# Patient Record
Sex: MALE | Race: WHITE | Employment: FULL TIME
[De-identification: names, ages, dates, MRNs, and addresses within clinical notes are randomized per-mention and may not be internally consistent; named-entity substitution may affect disease eponyms.]

---

## 2022-08-18 RX ORDER — ERGOCALCIFEROL 1.25 MG/1
CAPSULE ORAL
Qty: 30 CAPSULE | Refills: 0 | OUTPATIENT
Start: 2022-08-18

## 2022-10-11 NOTE — PROGRESS NOTES
Brandon Ackerman (: 1990) presents today for physical and follow up. He has a past medical history of high cholesterol and vitamin D deficiency. Blood work done last (2022) showing all normal. Started taking clomid- to try to get sperm count increased. Doing weight training and walking for exercise. Does not smoke. Drinks alcohol occasionally. Goes to ophthalmologist yearly. Goes to dentist twice a year. Had tdap . Will get flu vaccine soon-wanted to wait since he is playing golf tomorrow. Does not smoke. Drinks alcohol maybe 3 times a week. Had skin exam done and they removed a lesion-has follow up with them soon. Cholesterol Problem   The history is provided by the patient. This is a chronic problem. The problem occurs constantly. The problem has not changed since onset. Pertinent negatives include no chest pain, no abdominal pain, no headaches and no shortness of breath. Nothing aggravates the symptoms. The symptoms are relieved by diet changes. Treatments tried: Lipitor. The treatment provided moderate relief. Vitamin D Deficiency    The history is provided by the patient. This is a chronic problem. The problem has not changed since onset. There are no associated symptoms. Pertinent negatives include no malaise/fatigue. Medications have helped normalize vitamin d level. Risk factors include limited sun exposure.     Chief Complaint   Patient presents with    Annual Exam     Patient Active Problem List   Diagnosis    Episodic tension-type headache, not intractable    Migraine headache with aura    Generalized anxiety disorder     Past Medical History:   Diagnosis Date    Depression     Symptomatic off and on since high school    Dysplastic nevi     Episodic tension-type headache, not intractable 2015    Generalized anxiety disorder     Symptomatic since high school    Hypercholesterolemia Migraine headache with aura 12/6/2015    Seasonal allergic rhinitis     Vitamin D deficiency      Past Surgical History:   Procedure Laterality Date    WISDOM TOOTH EXTRACTION  12/2000     Family History   Problem Relation Age of Onset    Elevated Lipids Father     Cancer Paternal Grandfather         colon, pacreatic,    Heart Attack Paternal Grandfather     Thyroid Disease Paternal Grandfather     Diabetes Paternal Grandfather     Diabetes Paternal Grandmother     Stroke Paternal Grandmother     No Known Problems Sister     Seizures Brother         Also Autistic    No Known Problems Maternal Grandmother     No Known Problems Maternal Grandfather     Breast Cancer Paternal Aunt     Depression Mother     Anxiety Disorder Mother     Bipolar Disorder Mother     Hypertension Father      Social History     Socioeconomic History    Marital status:      Spouse name: None    Number of children: None    Years of education: None    Highest education level: None   Tobacco Use    Smoking status: Never    Smokeless tobacco: Never   Vaping Use    Vaping Use: Never used   Substance and Sexual Activity    Alcohol use: Yes    Drug use: No       Allergies   Allergen Reactions    Clindamycin Hives     Leukopenia and thrombocytopenia    Paroxetine Hcl Other (See Comments)     And very decreased libido on just 10 mh qd     Immunizations:  Immunization status: due today.     Review of Systems - History obtained from the patient  General ROS: negative for - chills, fatigue, or fever  Psychological ROS: negative for - depression  Ophthalmic ROS: negative for - blurry vision or double vision  ENT ROS: negative for - nasal congestion or sore throat  Respiratory ROS: no cough, shortness of breath, or wheezing  Cardiovascular ROS: no chest pain or dyspnea on exertion  Gastrointestinal ROS: no abdominal pain, change in bowel habits, or black or bloody stools  Genito-Urinary ROS: no dysuria, trouble voiding, or hematuria  Musculoskeletal ROS: negative for - joint pain or joint swelling  Neurological ROS: negative for - confusion, dizziness, or numbness/tingling  Dermatological ROS: negative for - pruritus or rash    BP (!) 140/70   Pulse 73   Temp 97.3 °F (36.3 °C) (Temporal)   Resp 16   Ht 6' (1.829 m)   Wt 166 lb (75.3 kg)   SpO2 96%   BMI 22.51 kg/m²     Physical Examination: General appearance - alert, well appearing, and in no distress, oriented to person, place, and time, and normal appearing weight  Mental status - alert, oriented to person, place, and time, normal mood, behavior, speech, dress, motor activity, and thought processes, affect appropriate to mood  Eyes - pupils equal and reactive, extraocular eye movements intact  Ears - bilateral TM's and external ear canals normal  Nose - normal and patent, no erythema, discharge or polyps  Mouth - mucous membranes moist, pharynx normal without lesions  Neck - supple, no significant adenopathy  Lymphatics - no palpable lymphadenopathy, no hepatosplenomegaly  Chest - clear to auscultation, no wheezes, rales or rhonchi, symmetric air entry  Heart - normal rate, regular rhythm, normal S1, S2, no murmurs, rubs, clicks or gallops  Abdomen - soft, nontender, nondistended, no masses or organomegaly  Back exam - full range of motion, no tenderness, palpable spasm or pain on motion  Neurological - alert, oriented, normal speech, no focal findings or movement disorder noted  Musculoskeletal - no joint tenderness, deformity or swelling  Extremities - peripheral pulses normal, no pedal edema, no clubbing or cyanosis  Skin - normal coloration and turgor, no rashes, no suspicious skin lesions noted    Assessment/Plan:   Diagnosis Orders   1. Routine general medical examination at a health care facility  CBC with Auto Differential    Lipid Panel    Comprehensive Metabolic Panel    TSH      2.  Mixed hyperlipidemia  atorvastatin (LIPITOR) 20 MG tablet    CBC with Auto Differential    Lipid Panel Comprehensive Metabolic Panel      3. Vitamin D deficiency, unspecified  Vitamin D 25 Hydroxy        Checking blood work today. Continue lipitor and vitamin d. High cholesterol can significantly increase your risk of developing chest pain, heart attack, and stroke. Cholesterol can be lowered with lifestyle changes and certain medications. Recommend to reduce total and saturated fat in diet, lose weight, participate in aerobic exercise, and eat plenty of fruits and vegetables. Vitamin D plays an important role in many places throughout the body, including the development and calcification of the bones. The main reasons for low levels of vitamin D are: Lack of vitamin D in the diet, often in conjunction with inadequate sun exposure, Inability to absorb vitamin D from the intestines, Inability to process vitamin D due to kidney or liver disease. Encouraged to increase sun light exposure safely and take medication if prescribed. Anticipatory Guidance/Education:  Anticipatory guidance for age-seatbelts, avoid cell phone use in car (may use hands free), no texting while driving, avoid social drugs and tobacco, limit alcohol, fall safety, personal safety. Encourage healthy diet and exercise daily. Follow up 6 months.   Delisa Arteaga, MILLI - CNP

## 2022-10-14 ENCOUNTER — OFFICE VISIT (OUTPATIENT)
Dept: FAMILY MEDICINE CLINIC | Facility: CLINIC | Age: 32
End: 2022-10-14
Payer: COMMERCIAL

## 2022-10-14 VITALS
TEMPERATURE: 97.3 F | RESPIRATION RATE: 16 BRPM | BODY MASS INDEX: 22.48 KG/M2 | HEART RATE: 73 BPM | SYSTOLIC BLOOD PRESSURE: 120 MMHG | OXYGEN SATURATION: 96 % | DIASTOLIC BLOOD PRESSURE: 80 MMHG | HEIGHT: 72 IN | WEIGHT: 166 LBS

## 2022-10-14 DIAGNOSIS — E55.9 VITAMIN D DEFICIENCY, UNSPECIFIED: ICD-10-CM

## 2022-10-14 DIAGNOSIS — Z00.00 ROUTINE GENERAL MEDICAL EXAMINATION AT A HEALTH CARE FACILITY: Primary | ICD-10-CM

## 2022-10-14 DIAGNOSIS — E78.2 MIXED HYPERLIPIDEMIA: ICD-10-CM

## 2022-10-14 LAB
BASOPHILS # BLD: 0.1 K/UL (ref 0–0.2)
BASOPHILS NFR BLD: 1 % (ref 0–2)
DIFFERENTIAL METHOD BLD: NORMAL
EOSINOPHIL # BLD: 0.1 K/UL (ref 0–0.8)
EOSINOPHIL NFR BLD: 2 % (ref 0.5–7.8)
ERYTHROCYTE [DISTWIDTH] IN BLOOD BY AUTOMATED COUNT: 12.7 % (ref 11.9–14.6)
HCT VFR BLD AUTO: 47.8 % (ref 41.1–50.3)
HGB BLD-MCNC: 15.9 G/DL (ref 13.6–17.2)
IMM GRANULOCYTES # BLD AUTO: 0 K/UL (ref 0–0.5)
IMM GRANULOCYTES NFR BLD AUTO: 0 % (ref 0–5)
LYMPHOCYTES # BLD: 1.7 K/UL (ref 0.5–4.6)
LYMPHOCYTES NFR BLD: 27 % (ref 13–44)
MCH RBC QN AUTO: 29.7 PG (ref 26.1–32.9)
MCHC RBC AUTO-ENTMCNC: 33.3 G/DL (ref 31.4–35)
MCV RBC AUTO: 89.3 FL (ref 82–102)
MONOCYTES # BLD: 0.5 K/UL (ref 0.1–1.3)
MONOCYTES NFR BLD: 8 % (ref 4–12)
NEUTS SEG # BLD: 3.7 K/UL (ref 1.7–8.2)
NEUTS SEG NFR BLD: 62 % (ref 43–78)
NRBC # BLD: 0 K/UL (ref 0–0.2)
PLATELET # BLD AUTO: 239 K/UL (ref 150–450)
PMV BLD AUTO: 9.7 FL (ref 9.4–12.3)
RBC # BLD AUTO: 5.35 M/UL (ref 4.23–5.6)
WBC # BLD AUTO: 6.1 K/UL (ref 4.3–11.1)

## 2022-10-14 PROCEDURE — 99395 PREV VISIT EST AGE 18-39: CPT | Performed by: NURSE PRACTITIONER

## 2022-10-14 PROCEDURE — 99214 OFFICE O/P EST MOD 30 MIN: CPT | Performed by: NURSE PRACTITIONER

## 2022-10-14 RX ORDER — ATORVASTATIN CALCIUM 20 MG/1
20 TABLET, FILM COATED ORAL DAILY
Qty: 90 TABLET | Refills: 1 | Status: SHIPPED | OUTPATIENT
Start: 2022-10-14 | End: 2023-04-12

## 2022-10-14 ASSESSMENT — PATIENT HEALTH QUESTIONNAIRE - PHQ9
SUM OF ALL RESPONSES TO PHQ QUESTIONS 1-9: 0
SUM OF ALL RESPONSES TO PHQ QUESTIONS 1-9: 0
1. LITTLE INTEREST OR PLEASURE IN DOING THINGS: 0
SUM OF ALL RESPONSES TO PHQ QUESTIONS 1-9: 0
SUM OF ALL RESPONSES TO PHQ QUESTIONS 1-9: 0
2. FEELING DOWN, DEPRESSED OR HOPELESS: 0
SUM OF ALL RESPONSES TO PHQ9 QUESTIONS 1 & 2: 0

## 2022-10-15 LAB
25(OH)D3 SERPL-MCNC: 47.2 NG/ML (ref 30–100)
ALBUMIN SERPL-MCNC: 4.2 G/DL (ref 3.5–5)
ALBUMIN/GLOB SERPL: 1.6 {RATIO} (ref 0.4–1.6)
ALP SERPL-CCNC: 47 U/L (ref 50–136)
ALT SERPL-CCNC: 35 U/L (ref 12–65)
ANION GAP SERPL CALC-SCNC: 1 MMOL/L (ref 2–11)
AST SERPL-CCNC: 13 U/L (ref 15–37)
BILIRUB SERPL-MCNC: 1.3 MG/DL (ref 0.2–1.1)
BUN SERPL-MCNC: 16 MG/DL (ref 6–23)
CALCIUM SERPL-MCNC: 9 MG/DL (ref 8.3–10.4)
CHLORIDE SERPL-SCNC: 109 MMOL/L (ref 101–110)
CHOLEST SERPL-MCNC: 124 MG/DL
CO2 SERPL-SCNC: 28 MMOL/L (ref 21–32)
CREAT SERPL-MCNC: 1.1 MG/DL (ref 0.8–1.5)
GLOBULIN SER CALC-MCNC: 2.6 G/DL (ref 2.8–4.5)
GLUCOSE SERPL-MCNC: 85 MG/DL (ref 65–100)
HDLC SERPL-MCNC: 43 MG/DL (ref 40–60)
HDLC SERPL: 2.9 {RATIO}
LDLC SERPL CALC-MCNC: 63.4 MG/DL
POTASSIUM SERPL-SCNC: 3.9 MMOL/L (ref 3.5–5.1)
PROT SERPL-MCNC: 6.8 G/DL (ref 6.3–8.2)
SODIUM SERPL-SCNC: 138 MMOL/L (ref 133–143)
TRIGL SERPL-MCNC: 88 MG/DL (ref 35–150)
TSH, 3RD GENERATION: 1.38 UIU/ML (ref 0.36–3.74)
VLDLC SERPL CALC-MCNC: 17.6 MG/DL (ref 6–23)

## 2023-08-31 ENCOUNTER — HOSPITAL ENCOUNTER (EMERGENCY)
Age: 33
Discharge: HOME | End: 2023-08-31
Attending: EMERGENCY MEDICINE
Payer: COMMERCIAL

## 2023-08-31 ENCOUNTER — APPOINTMENT (OUTPATIENT)
Dept: GENERAL RADIOLOGY | Age: 33
End: 2023-08-31
Payer: COMMERCIAL

## 2023-08-31 VITALS
DIASTOLIC BLOOD PRESSURE: 80 MMHG | BODY MASS INDEX: 22.75 KG/M2 | SYSTOLIC BLOOD PRESSURE: 122 MMHG | WEIGHT: 168 LBS | RESPIRATION RATE: 14 BRPM | HEIGHT: 72 IN | TEMPERATURE: 97.6 F | HEART RATE: 75 BPM | OXYGEN SATURATION: 97 %

## 2023-08-31 DIAGNOSIS — R07.9 CHEST PAIN, UNSPECIFIED TYPE: Primary | ICD-10-CM

## 2023-08-31 LAB
ANION GAP SERPL CALC-SCNC: 9 MMOL/L (ref 2–11)
BASOPHILS # BLD: 0.1 K/UL (ref 0–0.2)
BASOPHILS NFR BLD: 1 % (ref 0–2)
BUN SERPL-MCNC: 14 MG/DL (ref 6–23)
CALCIUM SERPL-MCNC: 9.6 MG/DL (ref 8.3–10.4)
CHLORIDE SERPL-SCNC: 104 MMOL/L (ref 98–107)
CO2 SERPL-SCNC: 28 MMOL/L (ref 21–32)
CREAT SERPL-MCNC: 0.98 MG/DL (ref 0.8–1.5)
DIFFERENTIAL METHOD BLD: ABNORMAL
EKG ATRIAL RATE: 78 BPM
EKG DIAGNOSIS: NORMAL
EKG P AXIS: 70 DEGREES
EKG P-R INTERVAL: 137 MS
EKG Q-T INTERVAL: 391 MS
EKG QRS DURATION: 100 MS
EKG QTC CALCULATION (BAZETT): 446 MS
EKG R AXIS: 81 DEGREES
EKG T AXIS: 83 DEGREES
EKG VENTRICULAR RATE: 78 BPM
EOSINOPHIL # BLD: 1.1 K/UL (ref 0–0.8)
EOSINOPHIL NFR BLD: 17 % (ref 0.5–7.8)
ERYTHROCYTE [DISTWIDTH] IN BLOOD BY AUTOMATED COUNT: 11.9 % (ref 11.9–14.6)
GLUCOSE SERPL-MCNC: 99 MG/DL (ref 65–100)
HCT VFR BLD AUTO: 47.2 % (ref 41.1–50.3)
HGB BLD-MCNC: 16.3 G/DL (ref 13.6–17.2)
IMM GRANULOCYTES # BLD AUTO: 0 K/UL (ref 0–0.5)
IMM GRANULOCYTES NFR BLD AUTO: 0 % (ref 0–5)
LYMPHOCYTES # BLD: 2.3 K/UL (ref 0.5–4.6)
LYMPHOCYTES NFR BLD: 35 % (ref 13–44)
MCH RBC QN AUTO: 29.7 PG (ref 26.1–32.9)
MCHC RBC AUTO-ENTMCNC: 34.5 G/DL (ref 31.4–35)
MCV RBC AUTO: 86 FL (ref 82–102)
MONOCYTES # BLD: 0.5 K/UL (ref 0.1–1.3)
MONOCYTES NFR BLD: 8 % (ref 4–12)
NEUTS SEG # BLD: 2.5 K/UL (ref 1.7–8.2)
NEUTS SEG NFR BLD: 39 % (ref 43–78)
NRBC # BLD: 0 K/UL (ref 0–0.2)
PLATELET # BLD AUTO: 239 K/UL (ref 150–450)
PMV BLD AUTO: 8.8 FL (ref 9.4–12.3)
POTASSIUM SERPL-SCNC: 3.9 MMOL/L (ref 3.5–5.1)
RBC # BLD AUTO: 5.49 M/UL (ref 4.23–5.6)
SODIUM SERPL-SCNC: 141 MMOL/L (ref 133–143)
TROPONIN T SERPL HS-MCNC: <6 NG/L (ref 0–22)
TROPONIN T SERPL HS-MCNC: <6 NG/L (ref 0–22)
WBC # BLD AUTO: 6.5 K/UL (ref 4.3–11.1)

## 2023-08-31 PROCEDURE — 80048 BASIC METABOLIC PNL TOTAL CA: CPT

## 2023-08-31 PROCEDURE — 84484 ASSAY OF TROPONIN QUANT: CPT

## 2023-08-31 PROCEDURE — 71046 X-RAY EXAM CHEST 2 VIEWS: CPT

## 2023-08-31 PROCEDURE — 93005 ELECTROCARDIOGRAM TRACING: CPT | Performed by: EMERGENCY MEDICINE

## 2023-08-31 PROCEDURE — 93010 ELECTROCARDIOGRAM REPORT: CPT | Performed by: INTERNAL MEDICINE

## 2023-08-31 PROCEDURE — 99285 EMERGENCY DEPT VISIT HI MDM: CPT

## 2023-08-31 PROCEDURE — 94762 N-INVAS EAR/PLS OXIMTRY CONT: CPT

## 2023-08-31 PROCEDURE — 6370000000 HC RX 637 (ALT 250 FOR IP): Performed by: EMERGENCY MEDICINE

## 2023-08-31 PROCEDURE — 85025 COMPLETE CBC W/AUTO DIFF WBC: CPT

## 2023-08-31 RX ORDER — MAGNESIUM HYDROXIDE/ALUMINUM HYDROXICE/SIMETHICONE 120; 1200; 1200 MG/30ML; MG/30ML; MG/30ML
30 SUSPENSION ORAL
Status: COMPLETED | OUTPATIENT
Start: 2023-08-31 | End: 2023-08-31

## 2023-08-31 RX ORDER — ASPIRIN 325 MG
325 TABLET ORAL
Status: COMPLETED | OUTPATIENT
Start: 2023-08-31 | End: 2023-08-31

## 2023-08-31 RX ADMIN — ASPIRIN 325 MG: 325 TABLET, FILM COATED ORAL at 05:37

## 2023-08-31 RX ADMIN — ALUMINUM HYDROXIDE, MAGNESIUM HYDROXIDE, AND SIMETHICONE 30 ML: 200; 200; 20 SUSPENSION ORAL at 06:26

## 2023-08-31 ASSESSMENT — ENCOUNTER SYMPTOMS
DIARRHEA: 0
VOMITING: 0
SHORTNESS OF BREATH: 0
ABDOMINAL PAIN: 0
FACIAL SWELLING: 0

## 2023-08-31 ASSESSMENT — LIFESTYLE VARIABLES
HOW OFTEN DO YOU HAVE A DRINK CONTAINING ALCOHOL: 2-4 TIMES A MONTH
HOW MANY STANDARD DRINKS CONTAINING ALCOHOL DO YOU HAVE ON A TYPICAL DAY: 1 OR 2

## 2023-08-31 ASSESSMENT — PAIN - FUNCTIONAL ASSESSMENT: PAIN_FUNCTIONAL_ASSESSMENT: 0-10

## 2023-08-31 ASSESSMENT — PAIN SCALES - GENERAL: PAINLEVEL_OUTOF10: 2

## 2023-08-31 NOTE — ED PROVIDER NOTES
Emergency Department Provider Note       PCP: MILLI Castellanos CNP   Age: 35 y.o. Sex: male     DISPOSITION Discharge - Pending Orders Complete 08/31/2023 06:09:46 AM       ICD-10-CM    1. Chest pain, unspecified type  R07.9           Medical Decision Making     Complexity of Problems Addressed:  1 or more acute illnesses that pose a threat to life or bodily function. Data Reviewed and Analyzed:   I independently ordered and reviewed each unique test.         I independently ordered and interpreted the ED EKG in the absence of a Cardiologist.    Rate: 78  EKG Interpretation: EKG Interpretation: sinus rhythm, no evidence of arrhythmia  ST Segments: Normal ST segments - NO STEMI  I interpreted the X-rays no acute cardiopulmonary abnormality. Discussion of management or test interpretation. Work-up so far unremarkable. Will check second troponin. Low suspicion for coronary etiology. PERC negative. Suspect some anxiety and possible GI etiology. Given GI cocktail. Plan to discharge if second troponin normal.      Risk of Complications and/or Morbidity of Patient Management:  Patient was discharged risks and benefits of hospitalization were considered. Shared medical decision making was utilized in creating the patients health plan today. Is this patient to be included in the SEP-1 core measure due to severe sepsis or septic shock? No Exclusion criteria - the patient is NOT to be included for SEP-1 Core Measure due to: Infection is not suspected      History      Heri Nelson is a 35 y.o. male who presents to the Emergency Department with chief complaint of    Chief Complaint   Patient presents with    Chest Pain      77-year-old male with history of high cholesterol and anxiety presents with sudden onset of \"pinching\" right-sided chest pain that woke him up from sleep around 4 AM.  He states pain gradually became more \"pronounced.   \"This then caused some shortness of breath and

## 2023-09-25 ENCOUNTER — INITIAL CONSULT (OUTPATIENT)
Age: 33
End: 2023-09-25
Payer: COMMERCIAL

## 2023-09-25 VITALS
HEIGHT: 72 IN | SYSTOLIC BLOOD PRESSURE: 116 MMHG | HEART RATE: 64 BPM | WEIGHT: 172 LBS | BODY MASS INDEX: 23.3 KG/M2 | DIASTOLIC BLOOD PRESSURE: 78 MMHG

## 2023-09-25 DIAGNOSIS — R00.2 PALPITATIONS: ICD-10-CM

## 2023-09-25 DIAGNOSIS — E78.00 PURE HYPERCHOLESTEROLEMIA: ICD-10-CM

## 2023-09-25 DIAGNOSIS — R07.89 OTHER CHEST PAIN: Primary | ICD-10-CM

## 2023-09-25 PROCEDURE — 99244 OFF/OP CNSLTJ NEW/EST MOD 40: CPT | Performed by: INTERNAL MEDICINE

## 2023-09-25 RX ORDER — ESCITALOPRAM OXALATE 5 MG/1
5 TABLET ORAL DAILY
COMMUNITY

## 2023-09-25 ASSESSMENT — ENCOUNTER SYMPTOMS
SHORTNESS OF BREATH: 0
VOMITING: 0
DIARRHEA: 0
NAUSEA: 0
SUSPICIOUS LESIONS: 0
COUGH: 0
SORE THROAT: 0

## 2023-09-25 NOTE — PROGRESS NOTES
[address] [facility], [facility], [address]  PHONE: [phone]    SUBJECTIVE:   Greg Singh is a 35 y.o. male 1990   seen for a consultation visit regarding the following:     Chief Complaint   Patient presents with    Chest Pain    Consultation              Consultation is requested for evaluation of Chest Pain and Consultation   . History of present illness: 35 y.o. male with PMH HLD, CHINTAN, depression presenting for evaluation of chest pain. Patient went to the ED on 8/31 and ruled out for ACS. Pain described as feeling like a pulled muscle in the center of his chest that woke up him up at 4 AM. He felt very anxious which made the pain even worse. He received a GI-cocktail with improvement. It continued to linger for a little while after. He has been on anxiety medication since then without any problems. He denies exertional chest pain. He has mild dyspnea when doing activities but nothing that is limiting to him. He had an isolated episode of palpitations described as heavy beats about one month ago. No other acute complaints. Father has history of exercise-induced VT. Grandfather has history of MI at 52. Reviewed ED visit notes. Past Medical History, Past Surgical History, Family history, Social History, and Medications were all reviewed with the patient today and updated as necessary.        Allergies   Allergen Reactions    Clindamycin Hives     Leukopenia and thrombocytopenia    Paroxetine Hcl Other (See Comments)     And very decreased libido on just 10 mh qd     Past Medical History:   Diagnosis Date    Depression     Symptomatic off and on since high school    Dysplastic nevi     Episodic tension-type headache, not intractable 12/6/2015    Generalized anxiety disorder     Symptomatic since high school    Hypercholesterolemia     Migraine headache with aura 12/6/2015    Seasonal allergic rhinitis     Vitamin D deficiency      Past Surgical History:   Procedure

## 2023-10-26 ENCOUNTER — OFFICE VISIT (OUTPATIENT)
Age: 33
End: 2023-10-26

## 2023-10-26 DIAGNOSIS — R00.2 PALPITATIONS: ICD-10-CM

## 2023-10-26 DIAGNOSIS — R07.89 OTHER CHEST PAIN: Primary | ICD-10-CM

## 2023-10-26 DIAGNOSIS — E78.00 PURE HYPERCHOLESTEROLEMIA: Chronic | ICD-10-CM

## 2023-10-26 LAB
STRESS ANGINA INDEX: 0
STRESS BASELINE DIAS BP: 84 MMHG
STRESS BASELINE HR: 80 BPM
STRESS BASELINE SYS BP: 130 MMHG
STRESS ESTIMATED WORKLOAD: 13.4 METS
STRESS EXERCISE DUR MIN: 10 MIN
STRESS EXERCISE DUR SEC: 39 SEC
STRESS PEAK DIAS BP: 84 MMHG
STRESS PEAK SYS BP: 184 MMHG
STRESS PERCENT HR ACHIEVED: 101 %
STRESS POST PEAK HR: 188 BPM
STRESS RATE PRESSURE PRODUCT: NORMAL BPM*MMHG
STRESS STAGE 1 BP: NORMAL MMHG
STRESS STAGE 1 DURATION: 3 MIN:SEC
STRESS STAGE 1 HR: 100 BPM
STRESS STAGE 2 BP: NORMAL MMHG
STRESS STAGE 2 DURATION: 3 MIN:SEC
STRESS STAGE 2 HR: 125 BPM
STRESS STAGE 3 BP: NORMAL MMHG
STRESS STAGE 3 DURATION: 3 MIN:SEC
STRESS STAGE 3 HR: 164 BPM
STRESS STAGE 4 BP: NORMAL MMHG
STRESS STAGE 4 DURATION: NORMAL MIN:SEC
STRESS STAGE 4 HR: 188 BPM
STRESS STAGE RECOVERY 1 BP: NORMAL MMHG
STRESS STAGE RECOVERY 1 DURATION: 0 MIN:SEC
STRESS STAGE RECOVERY 1 HR: 140 BPM
STRESS STAGE RECOVERY 2 BP: NORMAL MMHG
STRESS STAGE RECOVERY 2 DURATION: 2 MIN:SEC
STRESS STAGE RECOVERY 2 HR: 115 BPM
STRESS STAGE RECOVERY 3 BP: NORMAL MMHG
STRESS STAGE RECOVERY 3 DURATION: 4 MIN:SEC
STRESS STAGE RECOVERY 3 HR: 110 BPM
STRESS TARGET HR: 187 BPM

## 2023-10-26 NOTE — PROGRESS NOTES
[facility] [[email or web address]] CARDIOLOGY  [address] [place] [address]  PHONE: [phone]      10/26/23    NAME:  Jesus Diaz  : 1990  MRN: [id number]         SUBJECTIVE:   Jesus Diaz is a 35 y.o. male seen for a follow up visit regarding the following:     No chief complaint on file. HPI:  Follow up  No chief complaint on file. .    35 y.o. male with PMH HLD, CHINTAN, depression presenting for evaluation of chest pain. Patient has not had any further episodes of chest pain. He also denies KERN or palpitations. Key CAD CHF Meds            atorvastatin (LIPITOR) 20 MG tablet    Sig - Route: Take 1 tablet by mouth daily - Oral          Key Antihyperglycemic Medications       Patient is on no antihyperglycemic meds. Past Medical History, Past Surgical History, Family history, Social History, and Medications were all reviewed with the patient today and updated as necessary. Prior to Admission medications    Medication Sig Start Date End Date Taking?  Authorizing Provider   escitalopram (LEXAPRO) 5 MG tablet Take 1 tablet by mouth daily    Provider, MD Susy   atorvastatin (LIPITOR) 20 MG tablet Take 1 tablet by mouth daily 10/14/22 9/25/23  MILLI Dela Cruz - CNP   fluticasone (FLONASE) 50 MCG/ACT nasal spray INSTILL 1 TO 2 SPRAYS INTO EACH NOSTRIL ONCE OR TWICE DAILY 21   Automatic Reconciliation, Ar     Allergies   Allergen Reactions    Clindamycin Hives     Leukopenia and thrombocytopenia    Paroxetine Hcl Other (See Comments)     And very decreased libido on just 10 mh qd     Past Medical History:   Diagnosis Date    Depression     Symptomatic off and on since high school    Dysplastic nevi     Episodic tension-type headache, not intractable 2015    Generalized anxiety disorder     Symptomatic since high school    Hypercholesterolemia     Migraine headache with aura 2015    Seasonal allergic